# Patient Record
Sex: FEMALE | Race: BLACK OR AFRICAN AMERICAN | NOT HISPANIC OR LATINO
[De-identification: names, ages, dates, MRNs, and addresses within clinical notes are randomized per-mention and may not be internally consistent; named-entity substitution may affect disease eponyms.]

---

## 2020-07-31 ENCOUNTER — ERX REFILL RESPONSE (OUTPATIENT)
Age: 50
End: 2020-07-31

## 2020-07-31 RX ORDER — OMEPRAZOLE 40 MG/1
TAKE ONE CAPSULE BY MOUTH EVERY DAY CAPSULE, DELAYED RELEASE ORAL
Qty: 30 | Refills: 3

## 2020-09-01 ENCOUNTER — ERX REFILL RESPONSE (OUTPATIENT)
Age: 50
End: 2020-09-01

## 2020-09-01 RX ORDER — PANTOPRAZOLE SODIUM 40 MG/1
TAKE 1 TABLET BY MOUTH TWICE A DAY TABLET, DELAYED RELEASE ORAL ONCE A DAY
Qty: 30 | Refills: 2

## 2020-12-01 ENCOUNTER — ERX REFILL RESPONSE (OUTPATIENT)
Age: 50
End: 2020-12-01

## 2020-12-01 RX ORDER — PANTOPRAZOLE SODIUM 40 MG/1
1 TABLET TABLET, DELAYED RELEASE ORAL ONCE A DAY
Qty: 30 TABLET | Refills: 2

## 2021-10-14 ENCOUNTER — OFFICE VISIT (OUTPATIENT)
Dept: URBAN - METROPOLITAN AREA CLINIC 92 | Facility: CLINIC | Age: 51
End: 2021-10-14
Payer: COMMERCIAL

## 2021-10-14 DIAGNOSIS — K21.9 GASTROESOPHAGEAL REFLUX DISEASE, UNSPECIFIED WHETHER ESOPHAGITIS PRESENT: ICD-10-CM

## 2021-10-14 PROBLEM — 235595009: Status: ACTIVE | Noted: 2021-10-14

## 2021-10-14 PROCEDURE — 99214 OFFICE O/P EST MOD 30 MIN: CPT | Performed by: INTERNAL MEDICINE

## 2021-10-14 RX ORDER — CETIRIZINE HYDROCHLORIDE 10 MG/1
TAKE 1 TABLET (10 MG) BY ORAL ROUTE ONCE DAILY TABLET, FILM COATED ORAL 1
Qty: 0 | Refills: 0 | Status: ACTIVE | COMMUNITY
Start: 1900-01-01

## 2021-10-14 RX ORDER — TRETINOIN 0.025 %
APPLY TO THE AFFECTED AREA(S) BY TOPICAL ROUTE ONCE DAILY AT BEDTIME CREAM (GRAM) TOPICAL 1
Qty: 1 | Refills: 0 | Status: ON HOLD | COMMUNITY
Start: 1900-01-01

## 2021-10-14 RX ORDER — ROSUVASTATIN CALCIUM 10 MG/1
TAKE 1 TABLET (10 MG) BY ORAL ROUTE ONCE DAILY AT BEDTIME TABLET, FILM COATED ORAL 1
Qty: 0 | Refills: 0 | Status: ACTIVE | COMMUNITY
Start: 1900-01-01

## 2021-10-14 RX ORDER — LIDOCAINE AND PRILOCAINE 25; 25 MG/G; MG/G
APPLY TO AFFECTED AREA(S) BY TOPICAL ROUTE 2 TIMES A DAY FOR 30 DAYS CREAM TOPICAL
Qty: 30 | Refills: 0 | Status: ACTIVE | COMMUNITY
Start: 2019-06-04

## 2021-10-14 RX ORDER — ACETAMINOPHEN 500 MG/1
TAKE 1 TABLET (500 MG) BY ORAL ROUTE EVERY 3 HOURS AS NEEDED TABLET ORAL
Qty: 0 | Refills: 0 | Status: ACTIVE | COMMUNITY
Start: 1900-01-01

## 2021-10-14 RX ORDER — CARISOPRODOL 350 MG/1
TAKE 1 TABLET (350 MG) BY ORAL ROUTE 3 TIMES PER DAY AND AT BEDTIME TABLET ORAL
Qty: 0 | Refills: 0 | Status: ON HOLD | COMMUNITY
Start: 1900-01-01

## 2021-10-14 RX ORDER — PANTOPRAZOLE SODIUM 40 MG/1
1 TABLET TABLET, DELAYED RELEASE ORAL ONCE A DAY
Qty: 90 TABLET | Refills: 3 | OUTPATIENT
Start: 2021-10-14

## 2021-10-14 RX ORDER — TIZANIDINE 4 MG/1
TABLET ORAL
Qty: 0 | Refills: 0 | Status: ON HOLD | COMMUNITY
Start: 1900-01-01

## 2021-10-14 RX ORDER — OMEPRAZOLE 40 MG/1
TAKE ONE CAPSULE BY MOUTH EVERY DAY CAPSULE, DELAYED RELEASE ORAL
Qty: 30 | Refills: 3 | Status: ON HOLD | COMMUNITY

## 2021-10-14 RX ORDER — AMLODIPINE BESYLATE 2.5 MG
TAKE 1 TABLET (2.5 MG) BY ORAL ROUTE ONCE DAILY TABLET ORAL 1
Qty: 0 | Refills: 0 | Status: ACTIVE | COMMUNITY
Start: 1900-01-01

## 2021-10-14 RX ORDER — PANTOPRAZOLE SODIUM 40 MG
TAKE 1 TABLET BY ORAL ROUTE 2 TIMES A DAY TABLET, DELAYED RELEASE (ENTERIC COATED) ORAL 2
Qty: 60 | Refills: 0 | Status: ON HOLD | COMMUNITY
Start: 2020-05-26

## 2021-10-14 NOTE — HPI-TODAY'S VISIT:
Pt with complaint of dyspepsia and GERD for 2 weeks. Ran out of protonix script 3 weeks ago. Denies dysphagia, wt loss, gi bleeding or n/v., S/p sleeve gastrectomy but has gained weight. Symptoms worse with lying flat. No lower GI symptoms.  EGD 2019 gastritis Colonoscopy 2017 normal.

## 2023-03-03 ENCOUNTER — DASHBOARD ENCOUNTERS (OUTPATIENT)
Age: 53
End: 2023-03-03

## 2023-03-03 ENCOUNTER — OFFICE VISIT (OUTPATIENT)
Dept: URBAN - METROPOLITAN AREA CLINIC 86 | Facility: CLINIC | Age: 53
End: 2023-03-03
Payer: COMMERCIAL

## 2023-03-03 VITALS
HEIGHT: 66 IN | SYSTOLIC BLOOD PRESSURE: 117 MMHG | DIASTOLIC BLOOD PRESSURE: 79 MMHG | TEMPERATURE: 97.2 F | HEART RATE: 103 BPM | BODY MASS INDEX: 44.84 KG/M2 | WEIGHT: 279 LBS

## 2023-03-03 DIAGNOSIS — K76.0 FATTY LIVER: ICD-10-CM

## 2023-03-03 DIAGNOSIS — Z71.85 VACCINE COUNSELING: ICD-10-CM

## 2023-03-03 DIAGNOSIS — K74.3 PRIMARY BILIARY CHOLANGITIS: ICD-10-CM

## 2023-03-03 DIAGNOSIS — R74.8 ELEVATED LIVER ENZYMES: ICD-10-CM

## 2023-03-03 DIAGNOSIS — Z79.899 HIGH RISK MEDICATION USE: ICD-10-CM

## 2023-03-03 PROBLEM — 453861000124107: Status: ACTIVE | Noted: 2023-03-02

## 2023-03-03 PROCEDURE — 99214 OFFICE O/P EST MOD 30 MIN: CPT | Performed by: PHYSICIAN ASSISTANT

## 2023-03-03 RX ORDER — ACETAMINOPHEN 500 MG/1
TAKE 1 TABLET (500 MG) BY ORAL ROUTE EVERY 3 HOURS AS NEEDED TABLET ORAL
Qty: 0 | Refills: 0 | Status: ON HOLD | COMMUNITY
Start: 1900-01-01

## 2023-03-03 RX ORDER — URSODIOL 500 MG/1
1 TABLET TABLET ORAL
Qty: 90 TABLETS | Refills: 0 | OUTPATIENT
Start: 2023-03-03

## 2023-03-03 RX ORDER — CETIRIZINE HYDROCHLORIDE 10 MG/1
TAKE 1 TABLET (10 MG) BY ORAL ROUTE ONCE DAILY TABLET, FILM COATED ORAL 1
Qty: 0 | Refills: 0 | Status: ACTIVE | COMMUNITY
Start: 1900-01-01

## 2023-03-03 RX ORDER — CARISOPRODOL 350 MG/1
TAKE 1 TABLET (350 MG) BY ORAL ROUTE 3 TIMES PER DAY AND AT BEDTIME TABLET ORAL
Qty: 0 | Refills: 0 | COMMUNITY
Start: 1900-01-01

## 2023-03-03 RX ORDER — ROSUVASTATIN CALCIUM 10 MG/1
TAKE 1 TABLET (10 MG) BY ORAL ROUTE ONCE DAILY AT BEDTIME TABLET, FILM COATED ORAL 1
Qty: 0 | Refills: 0 | Status: ACTIVE | COMMUNITY
Start: 1900-01-01

## 2023-03-03 RX ORDER — TIZANIDINE 4 MG/1
TABLET ORAL
Qty: 0 | Refills: 0 | COMMUNITY
Start: 1900-01-01

## 2023-03-03 RX ORDER — PANTOPRAZOLE SODIUM 40 MG/1
1 TABLET TABLET, DELAYED RELEASE ORAL ONCE A DAY
Qty: 90 TABLET | Refills: 3 | Status: ACTIVE | COMMUNITY
Start: 2021-10-14

## 2023-03-03 RX ORDER — OMEPRAZOLE 40 MG/1
TAKE ONE CAPSULE BY MOUTH EVERY DAY CAPSULE, DELAYED RELEASE ORAL
Qty: 30 | Refills: 3 | Status: ACTIVE | COMMUNITY

## 2023-03-03 RX ORDER — AMLODIPINE BESYLATE 2.5 MG
TAKE 1 TABLET (2.5 MG) BY ORAL ROUTE ONCE DAILY TABLET ORAL 1
Qty: 0 | Refills: 0 | Status: ACTIVE | COMMUNITY
Start: 1900-01-01

## 2023-03-03 NOTE — HPI-TODAY'S VISIT:
Pt is a 52 year old female referred by Dr. Ian Lagos, she was previously seeing Dr. Elizalde in our group but lost to follow up in 2019. She is here for follow up on PBC.  A copy of the note will be sent to the referring provider.   PT w/ pmh including pbc bariatric surgery, gastroparesis, gastritis, fatty liver, elevated liver enzymes, Nery, hyperglycemia, vertigo, thyroid nodules, allergic rhinitis, spinal stenosis, lumbar radiculopathy, cervical spine stenosis. Listed taking lyrica 225 mg, colesevelam 625, meclizine 25 mg tid prn, crestor 20 mg, omeprazole 30 mg, azelastine 137 mcg, montelukast 10 mg, amitiza, movantik 25 mg tablet, ursodiol 500 mg, tramadol, cyclobenzaprine. S/p ccy, gastric sleeve in 2016 hx hiatal hernia repair.Social hx includes non smoker, denies ETOH, illicit drugs. 1/25/23 labs cr .63, sodium 140, k 4.1, alp 156, ast 12, alt 7 iron studies normal. cholesterol total < 200.  She is compliant on ursodiol 500 mg BID. Discussed the labs and need to increase the ursodiol to see if improves and she can work on the weight loss. Need updated imaging as well and will order now. Reminded to keep UTD on dexa scan

## 2023-03-04 LAB
A/G RATIO: 1.6
ALBUMIN: 4.6
ALKALINE PHOSPHATASE: 157
ALT (SGPT): 12
AST (SGOT): 13
BASO (ABSOLUTE): 0
BASOS: 1
BILIRUBIN, TOTAL: 0.4
BUN/CREATININE RATIO: 11
BUN: 8
CALCIUM: 9.8
CARBON DIOXIDE, TOTAL: 22
CHLORIDE: 106
CREATININE: 0.72
EGFR: 100
EOS (ABSOLUTE): 0.1
EOS: 1
GLOBULIN, TOTAL: 2.9
GLUCOSE: 94
HEMATOCRIT: 38.9
HEMATOLOGY COMMENTS:: (no result)
HEMOGLOBIN: 12.8
IMMATURE CELLS: (no result)
IMMATURE GRANS (ABS): 0
IMMATURE GRANULOCYTES: 0
LYMPHS (ABSOLUTE): 2.6
LYMPHS: 44
MCH: 28.4
MCHC: 32.9
MCV: 86
MONOCYTES(ABSOLUTE): 0.5
MONOCYTES: 9
NEUTROPHILS (ABSOLUTE): 2.7
NEUTROPHILS: 45
NRBC: (no result)
PLATELETS: 253
POTASSIUM: 4.3
PROTEIN, TOTAL: 7.5
RBC: 4.51
RDW: 13.9
SODIUM: 140
WBC: 6

## 2023-03-06 ENCOUNTER — TELEPHONE ENCOUNTER (OUTPATIENT)
Dept: URBAN - METROPOLITAN AREA CLINIC 86 | Facility: CLINIC | Age: 53
End: 2023-03-06

## 2023-03-06 NOTE — HPI-TODAY'S VISIT:
Dear Sandra Conner,   The labs are back. THe bilirubin 0.4, alkaline phosphatase 157, ast 13, alt 12.  The white blood cells, red blood cells, hemoglobin are all normal. Platelets normal at 253.  We will see how you do with the increase in the ursodiol. PLease be sure to work on the diet and exercise as well.  Tamela Merida PA-C

## 2023-03-07 ENCOUNTER — OFFICE VISIT (OUTPATIENT)
Dept: URBAN - METROPOLITAN AREA CLINIC 91 | Facility: CLINIC | Age: 53
End: 2023-03-07

## 2023-03-08 PROBLEM — 707724006: Status: ACTIVE | Noted: 2023-03-02

## 2023-03-08 PROBLEM — 31712002: Status: ACTIVE | Noted: 2023-03-03

## 2023-03-08 PROBLEM — 443913008: Status: ACTIVE | Noted: 2023-03-02

## 2023-03-08 PROBLEM — 197321007: Status: ACTIVE | Noted: 2023-03-02

## 2023-03-27 ENCOUNTER — LAB OUTSIDE AN ENCOUNTER (OUTPATIENT)
Dept: URBAN - METROPOLITAN AREA CLINIC 86 | Facility: CLINIC | Age: 53
End: 2023-03-27

## 2023-03-31 ENCOUNTER — OFFICE VISIT (OUTPATIENT)
Dept: URBAN - METROPOLITAN AREA CLINIC 86 | Facility: CLINIC | Age: 53
End: 2023-03-31

## 2023-04-04 ENCOUNTER — OFFICE VISIT (OUTPATIENT)
Dept: URBAN - METROPOLITAN AREA CLINIC 86 | Facility: CLINIC | Age: 53
End: 2023-04-04

## 2023-04-04 ENCOUNTER — OFFICE VISIT (OUTPATIENT)
Dept: URBAN - METROPOLITAN AREA CLINIC 91 | Facility: CLINIC | Age: 53
End: 2023-04-04

## 2023-04-04 RX ORDER — PANTOPRAZOLE SODIUM 40 MG/1
1 TABLET TABLET, DELAYED RELEASE ORAL ONCE A DAY
Qty: 90 TABLET | Refills: 3 | Status: ACTIVE | COMMUNITY
Start: 2021-10-14

## 2023-04-04 RX ORDER — ACETAMINOPHEN 500 MG/1
TAKE 1 TABLET (500 MG) BY ORAL ROUTE EVERY 3 HOURS AS NEEDED TABLET ORAL
Qty: 0 | Refills: 0 | Status: ON HOLD | COMMUNITY
Start: 1900-01-01

## 2023-04-04 RX ORDER — URSODIOL 500 MG/1
1 TABLET TABLET ORAL
Qty: 90 TABLETS | Refills: 0 | Status: ACTIVE | COMMUNITY
Start: 2023-03-03

## 2023-04-04 RX ORDER — URSODIOL 500 MG/1
1 TABLET TABLET ORAL
Qty: 90 TABLETS | Refills: 0 | OUTPATIENT

## 2023-04-04 RX ORDER — ROSUVASTATIN CALCIUM 10 MG/1
TAKE 1 TABLET (10 MG) BY ORAL ROUTE ONCE DAILY AT BEDTIME TABLET, FILM COATED ORAL 1
Qty: 0 | Refills: 0 | Status: ACTIVE | COMMUNITY
Start: 1900-01-01

## 2023-04-04 RX ORDER — TIZANIDINE 4 MG/1
TABLET ORAL
Qty: 0 | Refills: 0 | COMMUNITY
Start: 1900-01-01

## 2023-04-04 RX ORDER — AMLODIPINE BESYLATE 2.5 MG
TAKE 1 TABLET (2.5 MG) BY ORAL ROUTE ONCE DAILY TABLET ORAL 1
Qty: 0 | Refills: 0 | Status: ACTIVE | COMMUNITY
Start: 1900-01-01

## 2023-04-04 RX ORDER — OMEPRAZOLE 40 MG/1
TAKE ONE CAPSULE BY MOUTH EVERY DAY CAPSULE, DELAYED RELEASE ORAL
Qty: 30 | Refills: 3 | Status: ACTIVE | COMMUNITY

## 2023-04-04 RX ORDER — CARISOPRODOL 350 MG/1
TAKE 1 TABLET (350 MG) BY ORAL ROUTE 3 TIMES PER DAY AND AT BEDTIME TABLET ORAL
Qty: 0 | Refills: 0 | COMMUNITY
Start: 1900-01-01

## 2023-04-04 RX ORDER — CETIRIZINE HYDROCHLORIDE 10 MG/1
TAKE 1 TABLET (10 MG) BY ORAL ROUTE ONCE DAILY TABLET, FILM COATED ORAL 1
Qty: 0 | Refills: 0 | Status: ACTIVE | COMMUNITY
Start: 1900-01-01

## 2023-04-04 NOTE — HPI-TODAY'S VISIT:
Pt is a 52 year old female referred by Dr. Ian Lagos, she was previously seeing Dr. Elizalde in our group but lost to follow up in 2019. She is here for follow up on PBC.  A copy of the note will be sent to the referring provider.   4/4/23 The labs are back. THe bilirubin 0.4, alkaline phosphatase 157, ast 13, alt 12.  The white blood cells, red blood cells, hemoglobin are all normal. Platelets normal at 253.  We will see how you do with the increase in the ursodiol. PLease be sure to work on the diet and exercise as well.   recap PT w/ pmh including pbc bariatric surgery, gastroparesis, gastritis, fatty liver, elevated liver enzymes, Nery, hyperglycemia, vertigo, thyroid nodules, allergic rhinitis, spinal stenosis, lumbar radiculopathy, cervical spine stenosis. Listed taking lyrica 225 mg, colesevelam 625, meclizine 25 mg tid prn, crestor 20 mg, omeprazole 30 mg, azelastine 137 mcg, montelukast 10 mg, amitiza, movantik 25 mg tablet, ursodiol 500 mg, tramadol, cyclobenzaprine. S/p ccy, gastric sleeve in 2016 hx hiatal hernia repair.Social hx includes non smoker, denies ETOH, illicit drugs. 1/25/23 labs cr .63, sodium 140, k 4.1, alp 156, ast 12, alt 7 iron studies normal. cholesterol total < 200.  She is compliant on ursodiol 500 mg BID. Discussed the labs and need to increase the ursodiol to see if improves and she can work on the weight loss. Need updated imaging as well and will order now. Reminded to keep UTD on dexa scan